# Patient Record
Sex: FEMALE | Race: WHITE | URBAN - METROPOLITAN AREA
[De-identification: names, ages, dates, MRNs, and addresses within clinical notes are randomized per-mention and may not be internally consistent; named-entity substitution may affect disease eponyms.]

---

## 2017-08-10 ENCOUNTER — OFFICE VISIT (OUTPATIENT)
Dept: FAMILY MEDICINE CLINIC | Facility: CLINIC | Age: 57
End: 2017-08-10

## 2017-08-10 DIAGNOSIS — Z02.9 ENCOUNTERS FOR ADMINISTRATIVE PURPOSE: Primary | ICD-10-CM

## 2017-08-10 NOTE — PROGRESS NOTES
Pt checked in at [facility] to be seen for bug bites. Pt had called [facility] this morning to check visit cost and reported she had approx 10 bug bites and one was swollen and red. Pt checked in during high volume time in clinic.   Pt was no longer in waiting area whe